# Patient Record
Sex: FEMALE | Race: WHITE | NOT HISPANIC OR LATINO | URBAN - METROPOLITAN AREA
[De-identification: names, ages, dates, MRNs, and addresses within clinical notes are randomized per-mention and may not be internally consistent; named-entity substitution may affect disease eponyms.]

---

## 2024-04-17 ENCOUNTER — OUTPATIENT (OUTPATIENT)
Dept: OUTPATIENT SERVICES | Facility: HOSPITAL | Age: 44
LOS: 1 days | End: 2024-04-17
Payer: COMMERCIAL

## 2024-04-17 DIAGNOSIS — Z01.818 ENCOUNTER FOR OTHER PREPROCEDURAL EXAMINATION: ICD-10-CM

## 2024-04-17 LAB
BLD GP AB SCN SERPL QL: NEGATIVE — SIGNIFICANT CHANGE UP
HCT VFR BLD CALC: 36 % — SIGNIFICANT CHANGE UP (ref 34.5–45)
HGB BLD-MCNC: 12.4 G/DL — SIGNIFICANT CHANGE UP (ref 11.5–15.5)
MCHC RBC-ENTMCNC: 30.9 PG — SIGNIFICANT CHANGE UP (ref 27–34)
MCHC RBC-ENTMCNC: 34.4 GM/DL — SIGNIFICANT CHANGE UP (ref 32–36)
MCV RBC AUTO: 89.8 FL — SIGNIFICANT CHANGE UP (ref 80–100)
NRBC # BLD: 0 /100 WBCS — SIGNIFICANT CHANGE UP (ref 0–0)
PLATELET # BLD AUTO: 117 K/UL — LOW (ref 150–400)
RBC # BLD: 4.01 M/UL — SIGNIFICANT CHANGE UP (ref 3.8–5.2)
RBC # FLD: 13.2 % — SIGNIFICANT CHANGE UP (ref 10.3–14.5)
RH IG SCN BLD-IMP: POSITIVE — SIGNIFICANT CHANGE UP
WBC # BLD: 5.64 K/UL — SIGNIFICANT CHANGE UP (ref 3.8–10.5)
WBC # FLD AUTO: 5.64 K/UL — SIGNIFICANT CHANGE UP (ref 3.8–10.5)

## 2024-04-19 ENCOUNTER — INPATIENT (INPATIENT)
Facility: HOSPITAL | Age: 44
LOS: 2 days | Discharge: HOME | End: 2024-04-22
Attending: OBSTETRICS & GYNECOLOGY | Admitting: OBSTETRICS & GYNECOLOGY
Payer: COMMERCIAL

## 2024-04-19 VITALS
HEIGHT: 70 IN | WEIGHT: 255.07 LBS | SYSTOLIC BLOOD PRESSURE: 115 MMHG | HEART RATE: 75 BPM | DIASTOLIC BLOOD PRESSURE: 67 MMHG | RESPIRATION RATE: 16 BRPM | TEMPERATURE: 98 F

## 2024-04-19 DIAGNOSIS — Z98.891 HISTORY OF UTERINE SCAR FROM PREVIOUS SURGERY: Chronic | ICD-10-CM

## 2024-04-19 DIAGNOSIS — Z98.890 OTHER SPECIFIED POSTPROCEDURAL STATES: Chronic | ICD-10-CM

## 2024-04-19 LAB
BLD GP AB SCN SERPL QL: NEGATIVE — SIGNIFICANT CHANGE UP
RH IG SCN BLD-IMP: POSITIVE — SIGNIFICANT CHANGE UP

## 2024-04-19 RX ORDER — OXYCODONE HYDROCHLORIDE 5 MG/1
5 TABLET ORAL ONCE
Refills: 0 | Status: DISCONTINUED | OUTPATIENT
Start: 2024-04-19 | End: 2024-04-22

## 2024-04-19 RX ORDER — LEVOTHYROXINE SODIUM 125 MCG
88 TABLET ORAL DAILY
Refills: 0 | Status: DISCONTINUED | OUTPATIENT
Start: 2024-04-19 | End: 2024-04-22

## 2024-04-19 RX ORDER — ONDANSETRON 8 MG/1
4 TABLET, FILM COATED ORAL EVERY 6 HOURS
Refills: 0 | Status: DISCONTINUED | OUTPATIENT
Start: 2024-04-19 | End: 2024-04-22

## 2024-04-19 RX ORDER — CHLORHEXIDINE GLUCONATE 213 G/1000ML
1 SOLUTION TOPICAL DAILY
Refills: 0 | Status: DISCONTINUED | OUTPATIENT
Start: 2024-04-19 | End: 2024-04-19

## 2024-04-19 RX ORDER — LANOLIN
1 OINTMENT (GRAM) TOPICAL EVERY 6 HOURS
Refills: 0 | Status: DISCONTINUED | OUTPATIENT
Start: 2024-04-19 | End: 2024-04-22

## 2024-04-19 RX ORDER — DIPHENHYDRAMINE HCL 50 MG
25 CAPSULE ORAL EVERY 6 HOURS
Refills: 0 | Status: DISCONTINUED | OUTPATIENT
Start: 2024-04-19 | End: 2024-04-22

## 2024-04-19 RX ORDER — ACETAMINOPHEN 500 MG
1000 TABLET ORAL ONCE
Refills: 0 | Status: COMPLETED | OUTPATIENT
Start: 2024-04-19 | End: 2024-04-19

## 2024-04-19 RX ORDER — FAMOTIDINE 10 MG/ML
20 INJECTION INTRAVENOUS ONCE
Refills: 0 | Status: COMPLETED | OUTPATIENT
Start: 2024-04-19 | End: 2024-04-19

## 2024-04-19 RX ORDER — CITRIC ACID/SODIUM CITRATE 300-500 MG
30 SOLUTION, ORAL ORAL ONCE
Refills: 0 | Status: DISCONTINUED | OUTPATIENT
Start: 2024-04-19 | End: 2024-04-19

## 2024-04-19 RX ORDER — SIMETHICONE 80 MG/1
80 TABLET, CHEWABLE ORAL EVERY 4 HOURS
Refills: 0 | Status: DISCONTINUED | OUTPATIENT
Start: 2024-04-19 | End: 2024-04-22

## 2024-04-19 RX ORDER — ACETAMINOPHEN 500 MG
975 TABLET ORAL
Refills: 0 | Status: DISCONTINUED | OUTPATIENT
Start: 2024-04-19 | End: 2024-04-22

## 2024-04-19 RX ORDER — CEFAZOLIN SODIUM 1 G
2000 VIAL (EA) INJECTION ONCE
Refills: 0 | Status: COMPLETED | OUTPATIENT
Start: 2024-04-19 | End: 2024-04-19

## 2024-04-19 RX ORDER — OXYTOCIN 10 UNIT/ML
333.33 VIAL (ML) INJECTION
Qty: 20 | Refills: 0 | Status: DISCONTINUED | OUTPATIENT
Start: 2024-04-19 | End: 2024-04-19

## 2024-04-19 RX ORDER — DEXAMETHASONE 0.5 MG/5ML
4 ELIXIR ORAL EVERY 6 HOURS
Refills: 0 | Status: DISCONTINUED | OUTPATIENT
Start: 2024-04-19 | End: 2024-04-22

## 2024-04-19 RX ORDER — TETANUS TOXOID, REDUCED DIPHTHERIA TOXOID AND ACELLULAR PERTUSSIS VACCINE, ADSORBED 5; 2.5; 8; 8; 2.5 [IU]/.5ML; [IU]/.5ML; UG/.5ML; UG/.5ML; UG/.5ML
0.5 SUSPENSION INTRAMUSCULAR ONCE
Refills: 0 | Status: DISCONTINUED | OUTPATIENT
Start: 2024-04-19 | End: 2024-04-22

## 2024-04-19 RX ORDER — NALOXONE HYDROCHLORIDE 4 MG/.1ML
0.1 SPRAY NASAL
Refills: 0 | Status: DISCONTINUED | OUTPATIENT
Start: 2024-04-19 | End: 2024-04-22

## 2024-04-19 RX ORDER — OXYCODONE HYDROCHLORIDE 5 MG/1
5 TABLET ORAL
Refills: 0 | Status: DISCONTINUED | OUTPATIENT
Start: 2024-04-19 | End: 2024-04-22

## 2024-04-19 RX ORDER — KETOROLAC TROMETHAMINE 30 MG/ML
30 SYRINGE (ML) INJECTION EVERY 6 HOURS
Refills: 0 | Status: DISCONTINUED | OUTPATIENT
Start: 2024-04-19 | End: 2024-04-20

## 2024-04-19 RX ORDER — IBUPROFEN 200 MG
600 TABLET ORAL EVERY 6 HOURS
Refills: 0 | Status: COMPLETED | OUTPATIENT
Start: 2024-04-19 | End: 2025-03-18

## 2024-04-19 RX ORDER — OXYTOCIN 10 UNIT/ML
333.33 VIAL (ML) INJECTION
Qty: 20 | Refills: 0 | Status: DISCONTINUED | OUTPATIENT
Start: 2024-04-19 | End: 2024-04-22

## 2024-04-19 RX ORDER — MAGNESIUM HYDROXIDE 400 MG/1
30 TABLET, CHEWABLE ORAL
Refills: 0 | Status: DISCONTINUED | OUTPATIENT
Start: 2024-04-19 | End: 2024-04-22

## 2024-04-19 RX ORDER — SODIUM CHLORIDE 9 MG/ML
1000 INJECTION, SOLUTION INTRAVENOUS
Refills: 0 | Status: DISCONTINUED | OUTPATIENT
Start: 2024-04-19 | End: 2024-04-19

## 2024-04-19 RX ORDER — SODIUM CHLORIDE 9 MG/ML
1000 INJECTION, SOLUTION INTRAVENOUS
Refills: 0 | Status: DISCONTINUED | OUTPATIENT
Start: 2024-04-19 | End: 2024-04-22

## 2024-04-19 RX ADMIN — CHLORHEXIDINE GLUCONATE 1 APPLICATION(S): 213 SOLUTION TOPICAL at 11:25

## 2024-04-19 RX ADMIN — SODIUM CHLORIDE 200 MILLILITER(S): 9 INJECTION, SOLUTION INTRAVENOUS at 10:55

## 2024-04-19 RX ADMIN — Medication 1000 MILLIUNIT(S)/MIN: at 18:23

## 2024-04-19 RX ADMIN — Medication 400 MILLIGRAM(S): at 18:08

## 2024-04-19 RX ADMIN — Medication 975 MILLIGRAM(S): at 20:37

## 2024-04-19 RX ADMIN — SODIUM CHLORIDE 125 MILLILITER(S): 9 INJECTION, SOLUTION INTRAVENOUS at 18:25

## 2024-04-19 RX ADMIN — Medication 100 MILLIGRAM(S): at 15:00

## 2024-04-19 RX ADMIN — FAMOTIDINE 20 MILLIGRAM(S): 10 INJECTION INTRAVENOUS at 11:37

## 2024-04-19 NOTE — OB PROVIDER DELIVERY SUMMARY - NSSELHIDDEN_OBGYN_ALL_OB_FT
[NS_DeliveryAttending1_OBGYN_ALL_OB_FT:[id number]],[NS_DeliveryAssist1_OBGYN_ALL_OB_FT:[id number]],[NS_DeliveryRN_OBGYN_ALL_OB_FT:[id number]=]

## 2024-04-19 NOTE — OB RN INTRAOPERATIVE NOTE - NS_INTERMITTENTPNEUMATICCOMPRESSIOUNITBIOMEDNUMBER_OBGYN_ALL_OB_FT
OB Triage H&P    CC: Leakage of fluid    Provider: Faculty Dr. Sugar Tarango  EMILIANO: 10/22/23 by LMP  LMP: 1/15/2023    HPI:   Dereje Keating is a 35 year old  female at 37w4d gestation presenting to OB triage for leakage of fluid at 1213 with a gush of fluid, states she has been continuing to leak afterwards. States she feels decreased fetal movement. Also notes some pink tinged blood on toilet paper after fluid leakage. States there are blood clots in the toilet after urinating, still pink tinged toilet paper after this episode. Pt feeling contractions every 2-3 minutes, pain scale is 7/10. Pt desires repeat C section as she has pain with movement of the pelvis and is currently working with pelvic PT.     Patient has had mild range Bps previously. Not taking Bps at home. Denies headache, vision changes including blurred vision and scotoma, SOB, chest pain, RUQ/epigastric pain, and new-onset swelling.     Prenatal Labs:   Blood group Ab negative / Rubella immune / Varicella immune / RPR NR / HepbsAg negative   1hr / 3hr GTT 65, 162, 155, 97 / Hep C negative / HIV NR / GCCT NR / GBS negative  Genetic screening: Low risk    PNI:  Patient reports elevated blood pressures, no diagnosis of gestational HTN made.    OB U/S: gest age 37w4d, EFW   92%, placenta posterior no previa, presentation vertex, MVP 5.8    OBHx: Prior Csection  from suspected abruption at 33w4d, ligamentous loosening    PMH: None     PSHx: [place] teeth removal, no surgeries to belly    SHx: Denies alcohol, smoking, illicits    FHx: Denies hx of congenital disorders or bleeding/clotting disorders    Meds: Iron supplement every other day and prenatal vitamins daily    Allergies: NKA    PE:  Visit Vitals  LMP 01/15/2023       Gen: Alert and oriented, NAD  Abd: Soft, NT, gravid  Ext: no edema, NT    SSE: deferred  SVE: grossly ruptured, nitrazine positive     Fetal Heart Tracing:  Time of tracing reviewed:  and  [id number]    Contraction frequency: q2-3 minutes    Baseline: 130  Variability: min-mod  Accels: present  Decels: occ subtle variable decels, appropriate return to baseline    BSUS: vertex    CONSENT FOR  SECTION    Discussed risks of surgery including bleeding, infection, and damage to surrounding structures including bowel, bladder, and baby. Discussed if unable to stop bleeding, patient may require a hysterectomy. Discussed in the event of bleeding, patient may require blood transfusion. Risks of transfusion were discussed including risk of transfusion reaction, 1/200,000 risk of kalen Hepatitis B, 1/1,000,000 chance of kalen Hepatitis C, and 1/1,500,000 chance of kalen HIV. Patient is agreeable for a transfusion if needed. Also discussed in event of damage to surrounding structures, repair would be made in OR and any necessary specialists would be called in for assistance as needed.  Finally I explained the small risk of unrecognized intraoperative injury which may require reoperation. Patient is to recieve antibiotics prior to surgery. Patient consented to procedure. All questions answered.      A/P: Dereje Keating is a 35 year old  female at 37w4d gestation presenting in latent labor after SROM.     1. SROM, in latent labor   - patient grossly ruptured on exam, nitrazine positive  - kalen q2-3 mins on toco. SVE 3/50/-3.     2. PreE w/out SF  - Sustained severe 0250 0308  - s/p IV lab 20 10/5 0312  - previous preE labs on ,    - admit PreE labs pending  - plan for mag pp     3. Repeat C/S  - Patient consented.   - Accepts blood transfusion if necessary.   - Avoid methergine. All other uterotonics appropriate.    4. Fibroid   - 5.1 x 2.7 x 4.5, right lateral submucosal  - uterotonics other than methergine appropriate. Haylie in delivery room.    Dispo: L&D in stable condition    Krystle Marquis MD  OBGYN PGY1       [id number]

## 2024-04-19 NOTE — OB RN INTRAOPERATIVE NOTE - NSSELHIDDEN_OBGYN_ALL_OB_FT
[NS_DeliveryAttending1_OBGYN_ALL_OB_FT:[id number]],[NS_DeliveryAssist1_OBGYN_ALL_OB_FT:[id number]],[NS_DeliveryRN_OBGYN_ALL_OB_FT:[id number]=]

## 2024-04-19 NOTE — OB RN PATIENT PROFILE - FALL HARM RISK - UNIVERSAL INTERVENTIONS
Bed in lowest position, wheels locked, appropriate side rails in place/Call bell, personal items and telephone in reach/Instruct patient to call for assistance before getting out of bed or chair/Non-slip footwear when patient is out of bed/[place] to call system/Physically safe environment - no spills, clutter or unnecessary equipment/Purposeful Proactive Rounding/Room/bathroom lighting operational, light cord in reach

## 2024-04-19 NOTE — OB RN INTRAOPERATIVE NOTE - NS_IRRIGATIONS_OBGYN_ALL_OB
anaerobic blood culture bottles grew gram negative rods Blood culture from 03/19/22- Result anaerobic blood culture with gram negative rods. Pt given 1 unit PRBC previous shift. Pt IV pulled out during shift and blood loss. Hemoglobin 6.8 and Hematocrit 21.9. Other

## 2024-04-19 NOTE — OB RN DELIVERY SUMMARY - NS_SEPSISRSKCALC_OBGYN_ALL_OB_FT
EOS calculated successfully. EOS Risk Factor: 0.5/1000 live births ([facility] national incidence); GA=39w;Temp=97.7; ROM=0; GBS='Positive'; Antibiotics='No antibiotics or any antibiotics < 2 hrs prior to birth'

## 2024-04-19 NOTE — OB PROVIDER H&P - HISTORY OF PRESENT ILLNESS
43 y.o.  at 39+0 presenting for repeat c/s. She denies toxic complaints such as headache, scotoma, RUQ/epgiasric pain. - LOF - VB - CTX +FM  Patient feeling well.    Ante: IVF with own egg, normal pgt,  normal nipt, normal anatomy, passed gct, normal blood pressures; EFW 3400 ; GBS pos    OBHX:  G1: C/S FT arrest of dilation 9lbs 13 oz  G2: C/S FT  9 lbs 7 oz  G3: SAB  G4: MAB DxC  GynHX: denies STD/STI,cysts,fibroids, abnormal pap  MedHX: hypothyroidism  Surghx: denies  Medications: syn 88 QD, pnv,   Allergies: NKDA    Physical Exam:  T(C): 36.5 (24 @ 10:59), Max: 36.5 (24 @ 10:59)  HR: 75 (24 @ 10:59) (75 - 75)  BP: 115/67 (24 @ 10:59) (115/67 - 115/67)  RR: 16 (24 @ 10:59) (16 - 16)  SpO2: --    General: NAD  Abdomen: soft, gravid, nontender  Extremities: nonedematous  TAUS:     EFM:  120 baseline, mod danilo, + accel, - decel; reactive and reassuring  [place]: no ctx        A/p: 43 y.o.  at 39+0 presenting for repeat c/s.  - Pt admitted to L and D  - NPO and IVF  - Pt consented for repeat C/S  - Continuous monitoring  - All questions answered    D/w: Dr. Cooper and Dr. Crane 43 y.o.  at 39+0 presenting for repeat c/s + bilateral salpingectomy. She denies toxic complaints such as headache, scotoma, RUQ/epgiasric pain. - LOF - VB - CTX +FM  Patient feeling well.    Ante: IVF with own egg, normal pgt,  normal nipt, normal anatomy, passed gct, normal blood pressures; EFW 3500 by gumarods ; GBS pos    OBHX:  G1: SAB  G2: MAB DxC  G3:  C/S FT arrest of dilation 9lbs 13 oz  G4:  C/S FT  9 lbs 7 oz    GynHX: denies STD/STI,cysts,fibroids, abnormal pap  MedHX: hypothyroidism  Surghx: denies  Medications: syn 88 QD, pnv,   Allergies: NKDA    Physical Exam:  T(C): 36.5 (24 @ 10:59), Max: 36.5 (24 @ 10:59)  HR: 75 (24 @ 10:59) (75 - 75)  BP: 115/67 (24 @ 10:59) (115/67 - 115/67)  RR: 16 (24 @ 10:59) (16 - 16)  SpO2: --    General: NAD  Abdomen: soft, gravid, nontender  Extremities: nonedematous  TAUS: cephalic presentation, posterior placenta    EFM:  120 baseline, mod danilo, + accel, - decel; reactive and reassuring  [place]: no ctx        A/p: 43 y.o.  at 39+0 presenting for repeat c/s and bilateral salpingectomy  - Pt admitted to L and D  - NPO and IVF  - Pt consented for repeat C/S  - Continuous monitoring  - All questions answered    D/w: Dr. Cooper and Dr. Crane

## 2024-04-19 NOTE — OB PROVIDER DELIVERY SUMMARY - NSPROVIDERDELIVERYNOTE_OBGYN_ALL_OB_FT
uncomplicated repeat  section and bilateral salpingectomy performed  infant delivered without difficulty from cephalic presentation at 1558 with birthweight 3465g and 9/9 apgars  placenta delivered spontaneously and intact  uterus closed in 1 layer with 1-vicryl  muscle closed with 2-0 Vicryl  fascia closed with 0-vicryl  subcue closed with 2-0 plain in two layers  skin closed with 3-0 monocryl   IVF 1500

## 2024-04-19 NOTE — OB PROVIDER H&P - NSLOWPPHRISK_OBGYN_A_OB
[place] Pregnancy/No known bleeding disorder/No history of postpartum hemorrhage/No other PPH risks indicated

## 2024-04-19 NOTE — OB PROVIDER DELIVERY SUMMARY - NSANESTHESIALABOR_OBGYN_ALL_OB
Patient: Cece Castro Date: 2021  : 1937 Attending: Shakeel Guerra MD  84 year old female         Subjective     Still sinus         ROS     Constitutional: Negative for fever and chills.   Skin: Negative for rash.   HEENT: Negative for eye drainage, ear pain, sore throat.  Respiratory: Negative cough or shortness of breath.    Cardiovascular: Negative for chest pain or chest pressure.   Gastrointestinal: Negative for nausea, vomiting, diarrhea or abdominal pain.   Genitourinary: Negative for dysuria, frequency or hematuria.  Extremities:  Negative for joint swelling or joint pain.  Endocrine: Negative for heat or cold intolerance.  Psychiatric: Negative for change in mood or mentation.    ALLERGIES:  Patient has no known allergies.      Medications And Infusions     Scheduled:   • apixaBAN  5 mg Oral 2 times per day   • lisinopril  20 mg Oral Daily   • dilTIAZem  120 mg Oral Daily        Infusions:        Prior To Admission Medications  Medications Prior to Admission   Medication Sig Dispense Refill   • felodipine (PLENDIL) 10 MG 24 hr tablet Take 10 mg by mouth daily.     • lisinopril-hydroCHLOROthiazide (ZESTORETIC) 20-12.5 MG per tablet Take 1 tablet by mouth daily.     • Multiple Vitamins-Minerals (Centrum Silver Adult 50+) Tab Take by mouth daily.     • Vitamin E 180 mg (400 units) capsule Take by mouth daily.     • cetirizine (ZyrTEC) 10 MG tablet Take 10 mg by mouth daily as needed for Allergies.            Rhythm: Normal Sinus Rhythm      Physical Exam       Vitals Last Value 24 Hour Range  Temperature 97.3 °F (36.3 °C) Temp  Min: 97.3 °F (36.3 °C)  Max: 98.1 °F (36.7 °C)  Pulse 62 Pulse  Min: 62  Max: 97  Respiratory 16 Resp  Min: 16  Max: 16  Blood Pressure 114/66 BP  Min: 104/70  Max: 145/80  Arterial BP   No data recorded  Pulse Oximetry 96 % SpO2  Min: 94 %  Max: 96 %      Vitals Today Admission  Weight 68 kg (149 lb 14.6 oz) Weight: 70.3 kg (154 lb 15.7 oz)    Body mass index is  24.95 kg/m².    Weight over the past 48 Hours:  No data found.     Intake/Output:    I/O's    Intake/Output Summary (Last 24 hours) at 12/17/2021 1711  Last data filed at 12/17/2021 0800  Gross per 24 hour   Intake 0 ml   Output --   Net 0 ml       Physical Exam  Constitutional:       General: She is not in acute distress.     Appearance: She is not diaphoretic.   HENT:      Head: Normocephalic.   Eyes:      Pupils: Pupils are equal, round, and reactive to light.   Cardiovascular:      Rate and Rhythm: Normal rate and regular rhythm.      Heart sounds: Normal heart sounds, S1 normal and S2 normal. No murmur heard.      Pulmonary:      Effort: Pulmonary effort is normal.      Breath sounds: Normal breath sounds. No wheezing.   Abdominal:      General: There is no distension.      Palpations: Abdomen is soft.      Tenderness: There is no abdominal tenderness.   Musculoskeletal:         General: No deformity. Normal range of motion.      Cervical back: Normal range of motion.   Skin:     General: Skin is warm and dry.      Findings: No erythema.   Neurological:      Mental Status: She is alert and oriented to person, place, and time.           Laboratory Data       Recent Labs   Lab 12/17/21  0555 12/16/21  0612 12/15/21  1024 12/14/21  1703 12/14/21  1537 12/14/21  1537   WBC  --   --   --   --   --  6.3   HCT  --   --   --   --   --  38.7   HGB  --   --   --   --   --  13.7   PLT  --   --   --   --   --  202   INR  --   --   --  1.1  --   --    PTT  --   --   --  27  --   --    SODIUM 140 138 139  --    < > 138   POTASSIUM 3.7 3.6 3.6  --    < > 2.7*   CHLORIDE 106 107 107  --    < > 104   CO2 24 24 22  --    < > 25   GLUCOSE 107* 94 110*  --    < > 134*   BUN 18 13 12  --    < > 19   CREATININE 0.53 0.53 0.47*  --    < > 0.53   TSH  --   --   --  0.898  --   --     < > = values in this interval not displayed.       Recent Results (from the past 24 hour(s))   Comprehensive Metabolic Panel    Collection Time: 12/17/21   5:55 AM   Result Value Ref Range    Fasting Status      Sodium 140 135 - 145 mmol/L    Potassium 3.7 3.4 - 5.1 mmol/L    Chloride 106 98 - 107 mmol/L    Carbon Dioxide 24 21 - 32 mmol/L    Anion Gap 14 10 - 20 mmol/L    Glucose 107 (H) 70 - 99 mg/dL    BUN 18 6 - 20 mg/dL    Creatinine 0.53 0.51 - 0.95 mg/dL    Glomerular Filtration Rate >90 >=60    BUN/ Creatinine Ratio 34 (H) 7 - 25    Calcium 9.5 8.4 - 10.2 mg/dL    Bilirubin, Total 2.9 (H) 0.2 - 1.0 mg/dL    GOT/AST 60 (H) <=37 Units/L    GPT/ (H) <64 Units/L    Alkaline Phosphatase 220 (H) 45 - 117 Units/L    Albumin 2.9 (L) 3.6 - 5.1 g/dL    Protein, Total 6.5 6.4 - 8.2 g/dL    Globulin 3.6 2.0 - 4.0 g/dL    A/G Ratio 0.8 (L) 1.0 - 2.4                 Diagnostic Imaging     LAST ECHO/ECHO STRESS:  No valid procedures specified.  LAST MRI:  No results found for this or any previous visit.  LAST CT:  === 12/14/21 ===    CTA CHEST PULMONARY EMBOLISM W CONTRAST    - Narrative -  EXAM: CTA CHEST PULMONARY EMBOLISM W CONTRAST    CLINICAL INDICATION: New onset atrial fibrillation, chest pain    COMPARISON: None    TECHNIQUE: Sequential 2 mm thick axial CT images from the thoracic inlet to  the dome of the liver were acquired after IV contrast infusion following PE  protocol with additional coronal and sagittal reconstructions. MIP  reconstructions were performed on an independent workstation and sent PACS  for further interpretation.    A combination of at least one of these dose reduction techniques were used.  Automated exposure control for dose reduction, adjustment of the mA and/or  kV according to patient size, or use of iterative reconstruction technique  for dose reduction.    CONTRAST:    Name: Omnipaque  Concentration: 350  Volume: 71 mL  Administration: Intravenously    FINDINGS:    The exam is limited due to suboptimal timing of the contrast bolus and  respiratory motion artifact.  Given this, no filling defects identified in  the main or lobar  pulmonary arteries.  Evaluation of the segmental and  subsegmental branches is limited.    The heart is mildly enlarged.  There is no pericardial effusion.  The  thoracic aorta is normal in caliber.  There is dilatation of the main  pulmonary artery which measures up to 3.4 cm in diameter.    The lungs without evidence of pleural effusion, consolidation or  pneumothorax. No pulmonary mass or suspicious nodules identified. No bulky  mediastinal or hilar lymphadenopathy.    There are multiple nodules within the thyroid gland which is likely on the  basis of a multinodular goiter.    Visualized portions of the upper abdomen appear unremarkable.    There are moderate degenerative changes of the midthoracic spine.  No  suspicious osseous lesions are identified.    - Impression -  1.  Limited evaluation for segmental and subsegmental pulmonary emboli due  to respiratory motion artifact and suboptimal timing of the contrast bolus.  No evidence of large central or lobar pulmonary emboli.  2.  Lungs without evidence of focal consolidation, pleural effusion or  pneumothorax.  3.  Mild cardiomegaly with a dilated main pulmonary artery which may  represent underlying pulmonary artery hypertension.  Consider correlation  with echocardiogram.    Electronically Signed by: JOSHUA SYKES M.D.  Signed on: 12/14/2021 7:41 PM  LAST EKG:  Encounter Date: 12/14/21   ECG   Result Value    Ventricular Rate EKG/Min (BPM) 119    Atrial Rate (BPM) 119    QRS-Interval (MSEC) 84    QT-Interval (MSEC) 324    QTc 456    R Axis (Degrees) -15    T Axis (Degrees) -75    REPORT TEXT      Atrial tachycardia  Voltage criteria for left ventricular hypertrophy  Nonspecific ST and T wave abnormality  , probably digitalis effect  Abnormal ECG  No previous ECGs available  Reconfirmed by CLIFFORD GHOTRA MD [id number]) on 12/14/2021 5:33:17 PM       LAST X-RAY:  === 12/14/21 ===    XR CHEST PA OR AP 1 VIEW    - Narrative -  EXAM: XR CHEST PA OR AP 1 VIEW    DATE:  12/14/2021 at 1557 hours    CLINICAL INDICATION: Chest pain.  Abnormal EKG.    COMPARISON: None.    PROCEDURE: One view chest    FINDINGS:  Overlying monitor wires.    The cardiac silhouette is enlarged.  Aortic valve vascular calcifications.  The pulmonary vasculature appears within limits of normal.    The lungs are well-inflated. No focal consolidation. No large volume  pleural fluid.    Degenerative changes.    - Impression -  1.    Enlarged cardiac silhouette.  2.    No focal consolidation.    Electronically Signed by: BHASKAR ELIZALDE D.O.  Signed on: 12/14/2021 4:07 PM    NUC MED MYOCARDIAL PERFUSION SPECT MULTI   Final Result   1.   No scintigraphic finding of a suspicious focal reversible perfusion   abnormality.    2.    Decreased activity lateral wall compared to the septum at stress and   at rest.  Correlate with clinical EKG findings for scar with differential   of artifact secondary to septal wall hotspot.      Electronically Signed by: BHASKAR ELIZALDE D.O.    Signed on: 12/15/2021 5:30 PM          US LIVER GALLBLADDER PANCREAS   Final Result      Cholelithiasis, without cholecystitis.      Electronically Signed by: CHRIST NICHOLAS M.D.    Signed on: 12/15/2021 7:42 AM          CTA CHEST PULMONARY EMBOLISM W CONTRAST   Final Result   1.  Limited evaluation for segmental and subsegmental pulmonary emboli due   to respiratory motion artifact and suboptimal timing of the contrast bolus.    No evidence of large central or lobar pulmonary emboli.   2.  Lungs without evidence of focal consolidation, pleural effusion or   pneumothorax.    3.  Mild cardiomegaly with a dilated main pulmonary artery which may   represent underlying pulmonary artery hypertension.  Consider correlation   with echocardiogram.      Electronically Signed by: JOSHUA SYKES M.D.    Signed on: 12/14/2021 7:41 PM          XR CHEST PA OR AP 1 VIEW   Final Result   1.    Enlarged cardiac silhouette.    2.    No focal consolidation.       Electronically Signed by: BHASKAR ELIZALDE D.O.    Signed on: 12/14/2021 4:07 PM          MRI MRCP WO CONTRAST    (Results Pending)         CARDIAC STUDIES:   EKG:   Encounter Date: 12/14/21   ECG   Result Value    Ventricular Rate EKG/Min (BPM) 119    Atrial Rate (BPM) 119    QRS-Interval (MSEC) 84    QT-Interval (MSEC) 324    QTc 456    R Axis (Degrees) -15    T Axis (Degrees) -75    REPORT TEXT      Atrial tachycardia  Voltage criteria for left ventricular hypertrophy  Nonspecific ST and T wave abnormality  , probably digitalis effect  Abnormal ECG  No previous ECGs available  Reconfirmed by CLIFFORD GHOTRA MD [id number]) on 12/14/2021 5:33:17 PM         ECHO:   Date:   INDICATIONS:   Atrial fibrillation.     --------------------------------------------------------------------------  STUDY CONCLUSIONS  SUMMARY:     1. Left ventricle: The cavity size is normal. Wall thickness is mildly     increased. The ejection fraction was measured by biplane method of     disks. Wall motion is normal; there are no regional wall motion     abnormalities. The ejection fraction is 60%.  2. Aortic valve: Mild regurgitation.  3. Left atrium: The atrium is moderately dilated.  4. Baseline ECG: Atrial fibrillation.       No problem-specific Assessment & Plan notes found for this encounter.      Smoking Cessation  Counseling given: Not Answered       Assessment/Plan:  Assessment and Plan:  1. Atrial fibrillation with RVR (CMS/HCC)    2. Hypokalemia improved      84-year-old with no significant past medical history except for hypertension on lisinopril/hydrochlorothiazide she was complaining of some weakness over the last 2 weeks and she saw her primary care physician for routine visit and was found to be in atrial fibrillation with rapid ventricular response      1.  New onset atrial fibrillation, [id number] score 4 (age, female, hypertension)   -Rate control medication with Cardizem IV and pt was cardioverted to sinus    -Anticoagulation with Eliquis 5 mg twice a day  -Start Cardizem p.o.    -Echocardiogram shows ejection fraction was 60% and left atrial enlargement  -Consider [place] cardioversion if patient remains in A. Fib  -Rule out ischemia, will obtain nuclear stress test.     2.  Severe hypokalemia 2.7 was repleted  Hydrochlorothiazide was discontinued     3.  Elevated liver enzymes         Carey Aaron MD     Intrathecal

## 2024-04-19 NOTE — OB RN DELIVERY SUMMARY - NSSELHIDDEN_OBGYN_ALL_OB_FT
[NS_DeliveryAttending1_OBGYN_ALL_OB_FT:[id number]],[NS_DeliveryAssist1_OBGYN_ALL_OB_FT:[id number]],[NS_DeliveryRN_OBGYN_ALL_OB_FT:[id number]=]

## 2024-04-19 NOTE — OB RN DELIVERY SUMMARY - NS_NEWBORNAALIVE_OBGYN_ALL_OB
Problem: PHYSICAL THERAPY ADULT  Goal: Performs mobility at highest level of function for planned discharge setting.  See evaluation for individualized goals.  Description: Treatment/Interventions: Functional transfer training, LE strengthening/ROM, Therapeutic exercise, Endurance training, Cognitive reorientation, Patient/family training, Equipment eval/education, Bed mobility, Gait training, Spoke to nursing  Equipment Recommended: Walker       See flowsheet documentation for full assessment, interventions and recommendations.  Note: Prognosis: Good  Problem List: Decreased strength, Decreased range of motion, Decreased endurance, Impaired balance, Decreased mobility, Decreased safety awareness, Impaired judgement, Decreased cognition  Assessment: Pt seen for high complexity PT evaluation due to decrease in functional mobility status compared to baseline.  Pt with active PT eval/treat orders at this time.  Pt is a 83 y.o. F who presented to [facility] with fall from chair resulting in R 10th rib on 12/18/23.  Pt  has a past medical history of Anemia, Anxiety disorder, unspecified, Anxiety disorder, unspecified, Arthritis, Asthma, Cardiac arrest, cause unspecified ([facility]), Chronic obstructive pulmonary disease, unspecified ([facility]), Constipation, Essential (primary) hypertension, GERD (gastroesophageal reflux disease), Hypo-osmolality and hyponatremia, Hypothyroidism, Nontraumatic hematoma of soft tissue, Other seizures ([facility]), Persistent atrial fibrillation ([facility]), and Unspecified urinary incontinence.  [facility].  Pt presents with decreased strength, balance, endurance that contribute to limitations in bed mobility, functional transfers, functional mobility.  Pt requires Min A for supine>sit, S for STS, and CGA for ambulation at this time.  Pt left upright in bedside chair with chair alarm intact and with all needs in reach.  Pt will benefit from skilled therapy in order to  address current impairments and functional limitations. PT to follow pt and recommending return to facility with services.  The patient's AM-PAC Basic Mobility Inpatient Short Form Raw Score is 17. A Raw score of greater than 16 suggests the patient may benefit from discharge to home. Please also refer to the recommendation of the Physical Therapist for safe discharge planning.        Rehab Resource Intensity Level, PT: III (Minimum Resource Intensity)    See flowsheet documentation for full assessment.         Yes

## 2024-04-20 LAB
BASOPHILS # BLD AUTO: 0.02 K/UL — SIGNIFICANT CHANGE UP (ref 0–0.2)
BASOPHILS NFR BLD AUTO: 0.2 % — SIGNIFICANT CHANGE UP (ref 0–2)
EOSINOPHIL # BLD AUTO: 0.02 K/UL — SIGNIFICANT CHANGE UP (ref 0–0.5)
EOSINOPHIL NFR BLD AUTO: 0.2 % — SIGNIFICANT CHANGE UP (ref 0–6)
HCT VFR BLD CALC: 31.1 % — LOW (ref 34.5–45)
HGB BLD-MCNC: 10.8 G/DL — LOW (ref 11.5–15.5)
IMM GRANULOCYTES NFR BLD AUTO: 0.7 % — SIGNIFICANT CHANGE UP (ref 0–0.9)
LYMPHOCYTES # BLD AUTO: 1.14 K/UL — SIGNIFICANT CHANGE UP (ref 1–3.3)
LYMPHOCYTES # BLD AUTO: 11.1 % — LOW (ref 13–44)
MCHC RBC-ENTMCNC: 31.2 PG — SIGNIFICANT CHANGE UP (ref 27–34)
MCHC RBC-ENTMCNC: 34.7 GM/DL — SIGNIFICANT CHANGE UP (ref 32–36)
MCV RBC AUTO: 89.9 FL — SIGNIFICANT CHANGE UP (ref 80–100)
MONOCYTES # BLD AUTO: 0.64 K/UL — SIGNIFICANT CHANGE UP (ref 0–0.9)
MONOCYTES NFR BLD AUTO: 6.2 % — SIGNIFICANT CHANGE UP (ref 2–14)
NEUTROPHILS # BLD AUTO: 8.36 K/UL — HIGH (ref 1.8–7.4)
NEUTROPHILS NFR BLD AUTO: 81.6 % — HIGH (ref 43–77)
NRBC # BLD: 0 /100 WBCS — SIGNIFICANT CHANGE UP (ref 0–0)
PLATELET # BLD AUTO: 115 K/UL — LOW (ref 150–400)
RBC # BLD: 3.46 M/UL — LOW (ref 3.8–5.2)
RBC # FLD: 13.2 % — SIGNIFICANT CHANGE UP (ref 10.3–14.5)
T PALLIDUM AB TITR SER: NEGATIVE — SIGNIFICANT CHANGE UP
T PALLIDUM AB TITR SER: NEGATIVE — SIGNIFICANT CHANGE UP
WBC # BLD: 10.25 K/UL — SIGNIFICANT CHANGE UP (ref 3.8–10.5)
WBC # FLD AUTO: 10.25 K/UL — SIGNIFICANT CHANGE UP (ref 3.8–10.5)

## 2024-04-20 RX ORDER — IBUPROFEN 200 MG
600 TABLET ORAL EVERY 6 HOURS
Refills: 0 | Status: DISCONTINUED | OUTPATIENT
Start: 2024-04-20 | End: 2024-04-22

## 2024-04-20 RX ADMIN — Medication 975 MILLIGRAM(S): at 03:42

## 2024-04-20 RX ADMIN — Medication 88 MICROGRAM(S): at 08:58

## 2024-04-20 RX ADMIN — Medication 975 MILLIGRAM(S): at 21:14

## 2024-04-20 RX ADMIN — Medication 30 MILLIGRAM(S): at 06:46

## 2024-04-20 RX ADMIN — Medication 975 MILLIGRAM(S): at 08:20

## 2024-04-20 RX ADMIN — Medication 975 MILLIGRAM(S): at 14:07

## 2024-04-20 RX ADMIN — Medication 975 MILLIGRAM(S): at 14:40

## 2024-04-20 RX ADMIN — Medication 975 MILLIGRAM(S): at 03:17

## 2024-04-20 RX ADMIN — Medication 600 MILLIGRAM(S): at 18:15

## 2024-04-20 RX ADMIN — Medication 600 MILLIGRAM(S): at 17:44

## 2024-04-20 RX ADMIN — Medication 975 MILLIGRAM(S): at 21:32

## 2024-04-20 RX ADMIN — Medication 30 MILLIGRAM(S): at 11:24

## 2024-04-20 RX ADMIN — Medication 30 MILLIGRAM(S): at 00:32

## 2024-04-20 RX ADMIN — Medication 975 MILLIGRAM(S): at 08:50

## 2024-04-20 RX ADMIN — Medication 600 MILLIGRAM(S): at 23:55

## 2024-04-20 RX ADMIN — Medication 30 MILLIGRAM(S): at 00:13

## 2024-04-20 RX ADMIN — Medication 975 MILLIGRAM(S): at 21:31

## 2024-04-20 RX ADMIN — Medication 30 MILLIGRAM(S): at 12:00

## 2024-04-20 RX ADMIN — Medication 30 MILLIGRAM(S): at 06:52

## 2024-04-20 NOTE — PROGRESS NOTE ADULT - ASSESSMENT
43y Female POD#1  s/p repeat C/S, Uncomplicated                                       - Neuro/Pain:  toradol atc, tylenol atc, oxy prn  - CV:  VS per routine, AM CBC pending  - Pulm: Encourage ISS & Ambulation  - GI: Advance as tolerated  - : Squires in place, to be removed this morning, TOV this afternoon  - DVT ppx: SCDs, Lovenox 40mg QD  - Dispo: POD #2/3 43y Female POD#1  s/p repeat C/S, Uncomplicated                                       - Neuro/Pain:  toradol atc, tylenol atc, oxy prn  - CV:  VS per routine, AM CBC pending  - Pulm: Encourage ISS & Ambulation  - GI: Advance as tolerated  - : Squires in place, to be removed this morning, TOV this afternoon  - DVT ppx: SCDs  - Dispo: POD #3/4

## 2024-04-21 RX ORDER — IBUPROFEN 200 MG
1 TABLET ORAL
Qty: 0 | Refills: 0 | DISCHARGE
Start: 2024-04-21

## 2024-04-21 RX ORDER — ACETAMINOPHEN 500 MG
3 TABLET ORAL
Qty: 0 | Refills: 0 | DISCHARGE
Start: 2024-04-21

## 2024-04-21 RX ADMIN — Medication 975 MILLIGRAM(S): at 21:21

## 2024-04-21 RX ADMIN — Medication 975 MILLIGRAM(S): at 02:51

## 2024-04-21 RX ADMIN — Medication 600 MILLIGRAM(S): at 23:17

## 2024-04-21 RX ADMIN — Medication 975 MILLIGRAM(S): at 09:01

## 2024-04-21 RX ADMIN — Medication 600 MILLIGRAM(S): at 00:41

## 2024-04-21 RX ADMIN — Medication 88 MICROGRAM(S): at 06:47

## 2024-04-21 RX ADMIN — Medication 975 MILLIGRAM(S): at 20:35

## 2024-04-21 RX ADMIN — Medication 600 MILLIGRAM(S): at 03:31

## 2024-04-21 RX ADMIN — Medication 975 MILLIGRAM(S): at 03:31

## 2024-04-21 RX ADMIN — Medication 600 MILLIGRAM(S): at 06:24

## 2024-04-21 RX ADMIN — SIMETHICONE 80 MILLIGRAM(S): 80 TABLET, CHEWABLE ORAL at 11:52

## 2024-04-21 RX ADMIN — Medication 600 MILLIGRAM(S): at 11:52

## 2024-04-21 RX ADMIN — Medication 600 MILLIGRAM(S): at 06:41

## 2024-04-21 RX ADMIN — Medication 600 MILLIGRAM(S): at 18:39

## 2024-04-21 RX ADMIN — Medication 975 MILLIGRAM(S): at 14:55

## 2024-04-21 RX ADMIN — Medication 600 MILLIGRAM(S): at 18:09

## 2024-04-21 NOTE — DISCHARGE NOTE OB - CARE PLAN
1 Principal Discharge DX:	Pregnancy, delivered  Assessment and plan of treatment:	Take Motrin 600mg every 6 hours and/or tylenol 650mg every 6 hours as needed for pain. Call your OBGYN to schedule a follow up appointment in 1-2weeks. Keep incision site clean and dry. Call your OBGYN if you experiences severe abdominal pain not improved by oral pain medications, heavy bright red vaginal bleeding saturating more than 1 pad per hour, redness/warmth at incision site, drainage from incision site, or fever greater than 100.4F.   Principal Discharge DX:	Pregnancy, delivered  Assessment and plan of treatment:	Take Motrin 600mg every 6 hours and/or tylenol 975mg every 6 hours as needed for pain. Call your OBGYN to schedule a follow up appointment in 1-2weeks. Keep incision site clean and dry. Call your OBGYN if you experiences severe abdominal pain not improved by oral pain medications, heavy bright red vaginal bleeding saturating more than 1 pad per hour, redness/warmth at incision site, drainage from incision site, or fever greater than 100.4F.

## 2024-04-21 NOTE — DISCHARGE NOTE OB - PATIENT PORTAL LINK FT
You can access the FollowMyHealth Patient Portal offered by [facility] by registering at the following website: [email or web address]. By joining [facility]’s FollowMyHealth portal, you will also be able to view your health information using other applications (apps) compatible with our system.

## 2024-04-21 NOTE — PROGRESS NOTE ADULT - SUBJECTIVE AND OBJECTIVE BOX
Patient evaluated at bedside. Bonding well with baby. Pt is unsure if she wants to go home today or tomorrow.   She reports pain is well controlled with OPM.  She has been ambulating without assistance, voiding spontaneously, passing gas, tolerating regular diet.  She denies HA, dizziness, CP, palpitations, SOB, n/v, or heavy vaginal bleeding.    Physical Exam:  Vital Signs Last 24 Hrs  T(C): 36.8 (21 Apr 2024 06:15), Max: 36.8 (20 Apr 2024 18:00)  T(F): 98.2 (21 Apr 2024 06:15), Max: 98.3 (20 Apr 2024 18:00)  HR: 60 (21 Apr 2024 06:15) (60 - 72)  BP: 111/69 (21 Apr 2024 06:15) (103/68 - 119/70)  BP(mean): 87 (21 Apr 2024 03:00) (87 - 87)  RR: 18 (21 Apr 2024 06:15) (18 - 18)  SpO2: 97% (21 Apr 2024 06:15) (95% - 97%)    Parameters below as of 21 Apr 2024 03:00  Patient On (Oxygen Delivery Method): room air        Gen: NAD  Abd: + BS, soft, nontender, mildly distended, no rebound or guarding  Incision clean, dry and intact  uterus firm at midline  : lochia WNL  Extremities: no swelling or calf tenderness                          10.8   10.25 )-----------( 115      ( 20 Apr 2024 05:30 )             31.1

## 2024-04-21 NOTE — DISCHARGE NOTE OB - PLAN OF CARE
Take Motrin 600mg every 6 hours and/or tylenol 650mg every 6 hours as needed for pain. Call your OBGYN to schedule a follow up appointment in 1-2weeks. Keep incision site clean and dry. Call your OBGYN if you experiences severe abdominal pain not improved by oral pain medications, heavy bright red vaginal bleeding saturating more than 1 pad per hour, redness/warmth at incision site, drainage from incision site, or fever greater than 100.4F. Take Motrin 600mg every 6 hours and/or tylenol 975mg every 6 hours as needed for pain. Call your OBGYN to schedule a follow up appointment in 1-2weeks. Keep incision site clean and dry. Call your OBGYN if you experiences severe abdominal pain not improved by oral pain medications, heavy bright red vaginal bleeding saturating more than 1 pad per hour, redness/warmth at incision site, drainage from incision site, or fever greater than 100.4F.

## 2024-04-21 NOTE — LACTATION INITIAL EVALUATION - NS LACT CON REASON FOR REQ
39 wk baby seen on dol# 2, presents with 5.6% wt loss, voiding and stooling. This is the mothers third child. She combination fed her now 7 and 5 yr old boys for a few months each. She wishes to more exclusively bf this baby, reports successful feeds yesterdday but concerned baby is not sustained on breast today, formula being given. Baby placed skin to skin on mothers chest and complete breastfeeding education was provided, colostrum is expressible bilaterally upon hand expression as shown. Positioning and latch strategies were taught. Baby sleepy during conuslt, but with some assist attained a deep, sustained latch in a cradle hold to the left breast and fed for several minutes with an organized suck pattern. However, baby then released breast and when attempting relatch, baby again not sustaining. Oral anatomy appears wnl. Mother advised to remain skin to skin with baby. Breast pump at bedside as set up by bedside rn, mother to utilize pump as needed for any ineffective feeds in which fomrula required due to latching issues. To cont to monitor and f/u as needed. Responsive/ frequent feeds, continued/ increased SSC and rooming-in were encouraged. All questions were answered, mother verbalized understanding of teaching and info given. Referral for lactation telehealth was placed for further support s/p d/c./multiparous mom

## 2024-04-21 NOTE — DISCHARGE NOTE OB - HOSPITAL COURSE
Patient underwent an uncomplicated repeat c/s. Patient’s postoperative course was unremarkable and she remained hemodynamically stable and afebrile throughout. Upon discharge the patient is ambulating without assistance, voiding spontaneously, tolerating oral intake. Pain is well controlled with oral medication and vital signs are stable.

## 2024-04-21 NOTE — DISCHARGE NOTE OB - NS MD DC FALL RISK RISK
For information on Fall & Injury Prevention, visit: [email or web address] OR  [email or web address] OR  https://www.cdc.gov/steadi/patient.html

## 2024-04-21 NOTE — DISCHARGE NOTE OB - CARE PROVIDER_API CALL
Shannon Crane.  Obstetrics and Gynecology  [address] [place] [address], [place] [address]  Phone: [phone]  Fax: [phone]  Established Patient  Follow Up Time:

## 2024-04-21 NOTE — DISCHARGE NOTE OB - CHANGE SANITARY PADS FREQUENTLY.  WASHING AND WIPING SHOULD OCCUR FROM FRONT TO BACK
Ctn to resolve episode. covid test on 12/15 neg and no er or admits as of 1/4/22.   Ki Velasco RN, CPN - Care Transition Nurse- [phone] STAT referral     Patient need to be seen for a Closed displaced fracture of third metatarsal bone of right foot.    Patient can be reached at [phone].       Statement Selected

## 2024-04-21 NOTE — PROGRESS NOTE ADULT - ASSESSMENT
43y Female POD#2 s/p rC/S, uncomplicated                                     - Neuro/Pain: motrin atc, tylenol atc, oxy prn  - CV: VS per routine  - Pulm: Encourage ISS & Ambulation  - GI: Advance as tolerated  - : Voiding spontaneously  - DVT ppx: SCDs, Lovenox 40mg QD  - Dispo: POD #2/3

## 2024-04-21 NOTE — PROGRESS NOTE ADULT - ATTENDING COMMENTS
42yo P3 now POD#2 s/p RCSx2 and BS.   VSS, normotensive and non-tachycardic.   PE wnl, incision c/d/i.   Pt meeting all postop milestones, would like to be discharged today.   2 week postop check in the office.   Dc today. 44yo P3 now POD#2 s/p RCSx2 and BS.   VSS, normotensive and non-tachycardic.   Pt meeting all postop milestones, having a little more pain but does not desire PO oxy.   Currently working with lactation.   Plan for dc tomorrow.

## 2024-04-22 VITALS
DIASTOLIC BLOOD PRESSURE: 71 MMHG | RESPIRATION RATE: 16 BRPM | HEART RATE: 84 BPM | OXYGEN SATURATION: 96 % | SYSTOLIC BLOOD PRESSURE: 122 MMHG | TEMPERATURE: 98 F

## 2024-04-22 RX ADMIN — Medication 600 MILLIGRAM(S): at 11:42

## 2024-04-22 RX ADMIN — Medication 975 MILLIGRAM(S): at 03:41

## 2024-04-22 RX ADMIN — Medication 975 MILLIGRAM(S): at 02:21

## 2024-04-22 RX ADMIN — Medication 975 MILLIGRAM(S): at 09:44

## 2024-04-22 RX ADMIN — Medication 600 MILLIGRAM(S): at 06:23

## 2024-04-22 NOTE — PROGRESS NOTE ADULT - SUBJECTIVE AND OBJECTIVE BOX
Patient evaluated at bedside this morning, resting comfortable in bed.   She reports pain is well controlled.  She denies headache, dizziness, chest pain, palpitations, shortness of breathe, nausea, vomiting or heavy vaginal bleeding.  She has been ambulating without assistance, voiding spontaneously, passing gas, tolerating regular diet and is breastfeeding.    Physical Exam:  Vital Signs Last 24 Hrs  T(C): 36.7 (22 Apr 2024 06:00), Max: 36.7 (21 Apr 2024 18:00)  T(F): 98 (22 Apr 2024 06:00), Max: 98 (21 Apr 2024 18:00)  HR: 65 (22 Apr 2024 06:00) (65 - 70)  BP: 114/73 (22 Apr 2024 06:00) (114/73 - 130/80)  BP(mean): --  RR: 18 (22 Apr 2024 06:00) (16 - 18)  SpO2: 95% (22 Apr 2024 06:00) (95% - 97%)    Parameters below as of 21 Apr 2024 18:00  Patient On (Oxygen Delivery Method): room air        GA: NAD, A+0 x 3  Abd: soft, nontender, nondistended, no rebound or guarding, incision clean, dry and intact, uterus firm at midline and below umbilicus  : lochia WNL  Extremities: no swelling or calf tenderness

## 2024-04-22 NOTE — PROGRESS NOTE ADULT - ASSESSMENT
A/P: 43y s/p  section, POD#3, stable  -  Pain: PO motrin q6hrs, tylenol q8hrs, oxycodone for severe pain PRN  -  Post-operatively labs: post-op Hgb , hemodynamically stable, no symptoms of anemia   -  GI: tolerating regular diet, passing gas  -  : s/p rangel , urinating without difficulty  -  DVT prophylaxis: encouraged increased ambulation, SCDs, Lovenox  -  Dispo: POD 3 or 4

## 2024-05-04 LAB — SURGICAL PATHOLOGY STUDY: SIGNIFICANT CHANGE UP

## 2024-05-06 DIAGNOSIS — Z30.2 ENCOUNTER FOR STERILIZATION: ICD-10-CM

## 2024-05-06 DIAGNOSIS — Z3A.39 39 WEEKS GESTATION OF PREGNANCY: ICD-10-CM

## 2024-06-04 NOTE — PROGRESS NOTE ADULT - SUBJECTIVE AND OBJECTIVE BOX
Phone call from patient requesting letter from provider validating the need of his emotional support dog.  He stated he needs it as soon as possible.  I advised him that Ms. Gloria is not in the office this week but I will see what can be done for him.    Call back # [phone]   Patient evaluated at bedside this morning, resting comfortable in bed, with no acute events overnight.  She reports pain is well controlled with oral pain medications.   She denies headache, dizziness, chest pain, shortness of breath, vomiting or heavy vaginal bleeding.  She has not tried ambulating since procedure, rangel remains in place at this time. Tolerating diet.    Physical Exam:  Vital Signs Last 24 Hrs  T(C): 36.6 (20 Apr 2024 01:54), Max: 36.6 (20 Apr 2024 01:54)  T(F): 97.8 (20 Apr 2024 01:54), Max: 97.8 (20 Apr 2024 01:54)  HR: 61 (20 Apr 2024 01:54) (61 - 75)  BP: 107/61 (20 Apr 2024 01:54) (107/61 - 115/67)  BP(mean): 81 (19 Apr 2024 17:20) (76 - 81)  RR: 17 (20 Apr 2024 01:54) (16 - 17)  SpO2: 95% (20 Apr 2024 01:54) (94% - 97%)        GA: NAD, A+0 x 3  Pulm: no increased WOB  Abd: soft, nontender, mildly distended, no rebound or guarding, incision clean, dry and intact, uterus firm at midline, 2 fb below umbilicus  : rangel in situ, lochia WNL  Extremities: mild lower extremity edema, no calf tenderness, SCDs in place                  acetaminophen     Tablet .. 975 milliGRAM(s) Oral <User Schedule>  dexAMETHasone  Injectable 4 milliGRAM(s) IV Push every 6 hours PRN  diphenhydrAMINE 25 milliGRAM(s) Oral every 6 hours PRN  diphtheria/tetanus/pertussis (acellular) Vaccine (Adacel) 0.5 milliLiter(s) IntraMuscular once  ibuprofen  Tablet. 600 milliGRAM(s) Oral every 6 hours  ketorolac   Injectable 30 milliGRAM(s) IV Push every 6 hours  lactated ringers. 1000 milliLiter(s) IV Continuous <Continuous>  lanolin Ointment 1 Application(s) Topical every 6 hours PRN  levothyroxine 88 MICROGram(s) Oral daily  magnesium hydroxide Suspension 30 milliLiter(s) Oral two times a day PRN  naloxone Injectable 0.1 milliGRAM(s) IV Push every 3 minutes PRN  ondansetron Injectable 4 milliGRAM(s) IV Push every 6 hours PRN  oxyCODONE    IR 5 milliGRAM(s) Oral every 3 hours PRN  oxyCODONE    IR 5 milliGRAM(s) Oral once PRN  oxytocin Infusion 333.333 milliUNIT(s)/Min IV Continuous <Continuous>  simethicone 80 milliGRAM(s) Chew every 4 hours PRN

## 2025-01-28 NOTE — OB RN DELIVERY SUMMARY - NSMECDELIVBABYA_OBGYN_ALL_OB
Pt is calling to schedule a new consult appt.    New Consult- Dr. Yao  Referred by-Kathryn Goodman  Reason- Thrombocytopenia  Insurance- [employer]  Please give pt a call back. Thank you.   no

## 2025-02-04 ENCOUNTER — NON-APPOINTMENT (OUTPATIENT)
Age: 45
End: 2025-02-04

## 2025-02-04 PROBLEM — E03.9 HYPOTHYROIDISM, UNSPECIFIED: Chronic | Status: ACTIVE | Noted: 2023-07-10

## 2025-02-07 ENCOUNTER — APPOINTMENT (OUTPATIENT)
Dept: RADIOLOGY | Facility: HOSPITAL | Age: 45
End: 2025-02-07
Payer: COMMERCIAL

## 2025-02-07 ENCOUNTER — APPOINTMENT (OUTPATIENT)
Dept: MAMMOGRAPHY | Facility: HOSPITAL | Age: 45
End: 2025-02-07
Payer: COMMERCIAL

## 2025-02-07 ENCOUNTER — OUTPATIENT (OUTPATIENT)
Dept: OUTPATIENT SERVICES | Facility: HOSPITAL | Age: 45
LOS: 1 days | End: 2025-02-07
Payer: COMMERCIAL

## 2025-02-07 DIAGNOSIS — Z98.890 OTHER SPECIFIED POSTPROCEDURAL STATES: Chronic | ICD-10-CM

## 2025-02-07 DIAGNOSIS — Z98.891 HISTORY OF UTERINE SCAR FROM PREVIOUS SURGERY: Chronic | ICD-10-CM

## 2025-02-07 DIAGNOSIS — Z00.8 ENCOUNTER FOR OTHER GENERAL EXAMINATION: ICD-10-CM

## 2025-02-07 PROCEDURE — 73590 X-RAY EXAM OF LOWER LEG: CPT | Mod: 26,LT

## 2025-02-07 PROCEDURE — 77067 SCR MAMMO BI INCL CAD: CPT | Mod: 26

## 2025-02-07 PROCEDURE — 77067 SCR MAMMO BI INCL CAD: CPT

## 2025-02-07 PROCEDURE — 73590 X-RAY EXAM OF LOWER LEG: CPT

## 2025-02-07 PROCEDURE — 77063 BREAST TOMOSYNTHESIS BI: CPT | Mod: 26

## 2025-02-07 PROCEDURE — 77063 BREAST TOMOSYNTHESIS BI: CPT
